# Patient Record
Sex: FEMALE | Race: WHITE | URBAN - METROPOLITAN AREA
[De-identification: names, ages, dates, MRNs, and addresses within clinical notes are randomized per-mention and may not be internally consistent; named-entity substitution may affect disease eponyms.]

---

## 2024-01-08 ENCOUNTER — OFFICE VISIT (OUTPATIENT)
Dept: URBAN - METROPOLITAN AREA CLINIC 68 | Facility: CLINIC | Age: 74
End: 2024-01-08
Payer: COMMERCIAL

## 2024-01-08 ENCOUNTER — LAB OUTSIDE AN ENCOUNTER (OUTPATIENT)
Dept: URBAN - METROPOLITAN AREA CLINIC 68 | Facility: CLINIC | Age: 74
End: 2024-01-08

## 2024-01-08 VITALS
HEART RATE: 76 BPM | OXYGEN SATURATION: 98 % | DIASTOLIC BLOOD PRESSURE: 82 MMHG | HEIGHT: 63 IN | WEIGHT: 155 LBS | SYSTOLIC BLOOD PRESSURE: 128 MMHG | BODY MASS INDEX: 27.46 KG/M2

## 2024-01-08 DIAGNOSIS — R10.11 RIGHT UPPER QUADRANT ABDOMINAL PAIN: ICD-10-CM

## 2024-01-08 DIAGNOSIS — Z86.19 HISTORY OF HEPATITIS C: ICD-10-CM

## 2024-01-08 PROCEDURE — 99204 OFFICE O/P NEW MOD 45 MIN: CPT | Performed by: INTERNAL MEDICINE

## 2024-01-08 NOTE — HPI-TODAY'S VISIT:
73 y.o. 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 She does report having a colonoscopy over a year ago which showed colon polyps and will request outside records for review. She has history of open cholecystectomy in the past. She denies any gib, no n/v. She has occasional RUQ pain.

## 2024-01-15 LAB
A/G RATIO: 1.8
ALBUMIN: 4.8
ALKALINE PHOSPHATASE: 64
ALT (SGPT): 14
AST (SGOT): 20
BILIRUBIN, TOTAL: 0.6
BUN/CREATININE RATIO: 29
BUN: 31
CALCIUM: 10.8
CARBON DIOXIDE, TOTAL: 30
CHLORIDE: 103
CREATININE: 1.06
EGFR: 55
GLOBULIN, TOTAL: 2.6
GLUCOSE: 104
HEMATOCRIT: 40.6
HEMOGLOBIN: 13.9
HEPATITIS C VIRAL RNA: NOT DETECTED
INR: 1
MCH: 30.3
MCHC: 34.2
MCV: 88.6
MPV: 10.8
PLATELET COUNT: 249
POTASSIUM: 4.6
PROTEIN, TOTAL: 7.4
PT: 10.6
RDW: 12.6
RED BLOOD CELL COUNT: 4.58
SODIUM: 139
WHITE BLOOD CELL COUNT: 5.5

## 2024-01-23 ENCOUNTER — OFFICE VISIT (OUTPATIENT)
Dept: URBAN - METROPOLITAN AREA CLINIC 67 | Facility: CLINIC | Age: 74
End: 2024-01-23
Payer: COMMERCIAL

## 2024-01-23 DIAGNOSIS — Z90.49 ACQUIRED ABSENCE OF OTHER SPECIFIED PARTS OF DIGESTIVE TRACT: ICD-10-CM

## 2024-01-23 DIAGNOSIS — N28.1 RENAL CYST: ICD-10-CM

## 2024-01-23 DIAGNOSIS — B19.20 HEPATITIS C VIRUS INFECTION WITHOUT HEPATIC COMA, UNSPECIFIED CHRONICITY: ICD-10-CM

## 2024-01-23 PROCEDURE — 76700 US EXAM ABDOM COMPLETE: CPT | Performed by: INTERNAL MEDICINE

## 2024-01-23 PROCEDURE — 93975 VASCULAR STUDY: CPT | Performed by: INTERNAL MEDICINE

## 2024-02-05 ENCOUNTER — LAB (OUTPATIENT)
Dept: URBAN - METROPOLITAN AREA CLINIC 68 | Facility: CLINIC | Age: 74
End: 2024-02-05

## 2024-02-05 ENCOUNTER — OV EP (OUTPATIENT)
Dept: URBAN - METROPOLITAN AREA CLINIC 68 | Facility: CLINIC | Age: 74
End: 2024-02-05
Payer: COMMERCIAL

## 2024-02-05 VITALS
TEMPERATURE: 97.7 F | OXYGEN SATURATION: 99 % | BODY MASS INDEX: 27.46 KG/M2 | SYSTOLIC BLOOD PRESSURE: 120 MMHG | DIASTOLIC BLOOD PRESSURE: 80 MMHG | HEART RATE: 70 BPM | HEIGHT: 63 IN | WEIGHT: 155 LBS

## 2024-02-05 DIAGNOSIS — Z86.19 HISTORY OF HEPATITIS C: ICD-10-CM

## 2024-02-05 DIAGNOSIS — A04.8 H. PYLORI INFECTION: ICD-10-CM

## 2024-02-05 DIAGNOSIS — R10.11 RIGHT UPPER QUADRANT ABDOMINAL PAIN: ICD-10-CM

## 2024-02-05 DIAGNOSIS — R11.0 NAUSEA: ICD-10-CM

## 2024-02-05 PROBLEM — 721730009: Status: ACTIVE | Noted: 2024-02-05

## 2024-02-05 PROBLEM — 422587007: Status: ACTIVE | Noted: 2024-02-05

## 2024-02-05 PROBLEM — 93871000119101: Status: ACTIVE | Noted: 2024-02-05

## 2024-02-05 PROBLEM — 301717006: Status: ACTIVE | Noted: 2024-01-08

## 2024-02-05 PROCEDURE — 99214 OFFICE O/P EST MOD 30 MIN: CPT

## 2024-02-05 RX ORDER — AMLODIPINE BESYLATE 5 MG/1
TABLET ORAL
Qty: 90 TABLET | Status: ACTIVE | COMMUNITY

## 2024-02-05 RX ORDER — LEVOTHYROXINE SODIUM 0.05 MG/1
TAKE 1 TABLET BY MOUTH EVERY DAY TABLET ORAL
Qty: 90 EACH | Refills: 0 | Status: ACTIVE | COMMUNITY

## 2024-02-05 RX ORDER — METOPROLOL SUCCINATE 25 MG/1
TAKE 1 TABLET BY MOUTH EVERY DAY TABLET, EXTENDED RELEASE ORAL
Qty: 90 EACH | Refills: 0 | Status: ACTIVE | COMMUNITY

## 2024-02-05 RX ORDER — NIRMATRELVIR AND RITONAVIR 150-100 MG
KIT ORAL
Qty: 20 TABLET | Status: ACTIVE | COMMUNITY

## 2024-02-05 RX ORDER — TRIAMTERENE AND HYDROCHLOROTHIAZIDE 37.5; 25 MG/1; MG/1
TAKE 1 TABLET BY MOUTH EVERY DAY TABLET ORAL
Qty: 90 EACH | Refills: 0 | Status: ACTIVE | COMMUNITY

## 2024-02-05 NOTE — HPI-TODAY'S VISIT:
72 y/o F with history of cholecystectomy, colon polyps (colonoscopy ~2022) and chronic Hepatitis C (s/p INF/Ribavirin) who apparently did have SVR, presenting for follow up of intermittent RUQ pain s/p US (1/22/24) and labs (1/8/24). She reports she is feeling well with improved RUQ pain, although she does continue with occasional, infrequent episodes of pain and accompanying persistent nausea. She admits to hx of H. pylori infection on remote past and was subsequently treated with abx therapy but notes she never underwent PATTI to confirm H. pylori eradications. Unfortunately, she does not recall who/where she was diagnosed in managed other than by a local female GI, described as "small with dark hair". EGD is discussed, but pt would like to hold off in light of somewhat improved symptoms and defers at this time. Will order UBT for further eval and pt advised to try and find records from her previous GI care, so we can request records. CT A/P is additionally recommended for further eval and r/o of mass lesion. Patient denies vomiting, dysphagia, odynophagia, heartburn, diarrhea, constipation, GI bleeding, or unintentional weight loss

## 2024-02-21 LAB — H PYLORI BREATH TEST: NOT DETECTED

## 2024-03-11 ENCOUNTER — OV EP (OUTPATIENT)
Dept: URBAN - METROPOLITAN AREA CLINIC 68 | Facility: CLINIC | Age: 74
End: 2024-03-11

## 2024-04-01 ENCOUNTER — OV EP (OUTPATIENT)
Dept: URBAN - METROPOLITAN AREA CLINIC 68 | Facility: CLINIC | Age: 74
End: 2024-04-01